# Patient Record
Sex: MALE | Race: WHITE | Employment: FULL TIME
[De-identification: names, ages, dates, MRNs, and addresses within clinical notes are randomized per-mention and may not be internally consistent; named-entity substitution may affect disease eponyms.]

---

## 2018-09-22 ENCOUNTER — HEALTH MAINTENANCE LETTER (OUTPATIENT)
Age: 63
End: 2018-09-22

## 2019-10-16 ENCOUNTER — HOSPITAL ENCOUNTER (OUTPATIENT)
Dept: WOUND CARE | Facility: CLINIC | Age: 64
Discharge: HOME | End: 2019-10-16
Attending: SURGERY | Admitting: SURGERY
Payer: COMMERCIAL

## 2019-10-16 VITALS
HEIGHT: 70 IN | RESPIRATION RATE: 19 BRPM | SYSTOLIC BLOOD PRESSURE: 125 MMHG | WEIGHT: 181.8 LBS | DIASTOLIC BLOOD PRESSURE: 81 MMHG | TEMPERATURE: 97.9 F | HEART RATE: 75 BPM | BODY MASS INDEX: 26.03 KG/M2

## 2019-10-16 DIAGNOSIS — L97.322 SKIN ULCER OF LEFT ANKLE WITH FAT LAYER EXPOSED (H): ICD-10-CM

## 2019-10-16 DIAGNOSIS — L97.922 ULCER OF LEFT LOWER EXTREMITY WITH FAT LAYER EXPOSED (H): Primary | ICD-10-CM

## 2019-10-16 PROBLEM — N52.9 ED (ERECTILE DYSFUNCTION) OF ORGANIC ORIGIN: Status: ACTIVE | Noted: 2018-02-14

## 2019-10-16 PROCEDURE — 99203 OFFICE O/P NEW LOW 30 MIN: CPT | Mod: 25 | Performed by: SURGERY

## 2019-10-16 PROCEDURE — 11104 PUNCH BX SKIN SINGLE LESION: CPT | Performed by: SURGERY

## 2019-10-16 PROCEDURE — 88305 TISSUE EXAM BY PATHOLOGIST: CPT | Mod: TC | Performed by: SURGERY

## 2019-10-16 PROCEDURE — G0463 HOSPITAL OUTPT CLINIC VISIT: HCPCS | Mod: 25

## 2019-10-16 PROCEDURE — 88312 SPECIAL STAINS GROUP 1: CPT | Mod: TC | Performed by: SURGERY

## 2019-10-16 PROCEDURE — A6197 ALGINATE DRSG >16 <=48 SQ IN: HCPCS

## 2019-10-16 PROCEDURE — 11104 PUNCH BX SKIN SINGLE LESION: CPT

## 2019-10-16 RX ORDER — DIOSMIN COMPLEX NO.1 630 MG
1 TABLET ORAL 2 TIMES DAILY
Qty: 180 TABLET | Refills: 3 | Status: SHIPPED | OUTPATIENT
Start: 2019-10-16

## 2019-10-16 RX ORDER — EMOLLIENT COMBINATION NO.32
1 EMULSION, EXTENDED RELEASE TOPICAL 2 TIMES DAILY
Qty: 225 G | Refills: 0 | Status: SHIPPED | OUTPATIENT
Start: 2019-10-16 | End: 2020-03-17

## 2019-10-16 RX ORDER — SILDENAFIL CITRATE 20 MG/1
TABLET ORAL
COMMUNITY
Start: 2019-01-23

## 2019-10-16 RX ORDER — MUPIROCIN 20 MG/G
OINTMENT TOPICAL
Refills: 0 | COMMUNITY
Start: 2019-10-08

## 2019-10-16 RX ORDER — CLOTRIMAZOLE AND BETAMETHASONE DIPROPIONATE 10; .64 MG/G; MG/G
CREAM TOPICAL
COMMUNITY
Start: 2019-07-31

## 2019-10-16 RX ORDER — CEPHALEXIN 500 MG/1
500 CAPSULE ORAL
COMMUNITY
Start: 2019-10-04 | End: 2019-10-24

## 2019-10-16 NOTE — DISCHARGE INSTRUCTIONS
"  [facility] WOUND HEALING [place]  [address] [facility] [address] [place] [address]  Appointment Phone [phone] Nurse Advisors [phone]    Armando Ballesteros      1955  1.Please add in 1 packet of Александр Supplement TWICE a day until your next appointment   2.Please add in Vitamin D3 Vitamin 5,000 international units per day.  3.Take Vasculera one tablet twice a day to help decrease inflammation of your chronic venous disease.  4.EpiCeram helps repair and heal the skin barrier through a unique mechanism of action not commonly found in other medications. It contains the skin s natural level of essential lipids: ceramides, cholesterol and free fatty acids, which are reduced in patients with eczema and atopic dermatitis.  EpiCeram is steroid-free, fragrance-free, noncomedogenic,paraben-free, and propylene glycol-free.   Available in a 90g tube and 225g airless pump  Please call [phone] to get this prescription  5.shop[employer] or call [phone] to place an order for 50 gram tube  Use PLUROHEALS (all caps) at checkout in the discount code section  6. Smoothtoe socks for compression after you are healed.  Wound Dressing Change:Left Lateral Ankle  Cleanse wound and surrounding skin with: prontosan wound cleanser  Apply Plurogel to wound base or on silvercel dressing  Cover wound Edemawear   Change dressing daily     GIANFRANCO Hodge M.D.. October 16, 2019    Call us at [phone] if you have any questions about your wounds, have redness or swelling around your wound, have a fever of 101 or greater or if you have any other problems or concerns. We answer the phone Monday through Friday 8 am to 4 pm, please leave a message as we check the voicemail frequently throughout the day.     Follow up with Provider - 2 weeks     \"EdemaWear Open Toe\"   Size: Medium        "

## 2019-10-16 NOTE — PROGRESS NOTES
Patient arrived for wound care visit. Certified Wound Care Nurse time spent evaluating patient record, completed a full evaluation and documented wound(s) & milton-wound skin; provided recommendation based on treatment plan. Applied dressing, reviewed discharge instructions, patient education, and discussed plan of care with appropriate medical team staff members and patient and/or family members.     Samples given for bryant [id number] Vasculera [phone] [facility] [id number]

## 2019-10-16 NOTE — PROGRESS NOTES
[facility] Wound Healing [place] Progress Note    Subject: Armando Ballesteros, patient self-referred for evaluation of left lateral inframalleolar recurrent ulceration, history of opioid vasculopathy, previously on Plavix, Plavix was stopped presently 2 years ago by physician at [facility] after completion of multiple venous procedures left lower extremity.  Patient is not overtly healthy 64-year-old male, diagnosed with livedoid vasculopathy in 2006, see Dr. Tiffanie Carrion's note, Lynda Geovanna, had been on Plavix.  Begin chewing tobacco several months ago, recurrence of left lateral infra malleolar ulceration, he has subsequently ceased tobacco utilization.  Describes pain, irritation, drainage, difficulty wearing a shoe.  Reinitiated wearing compression sock.  No history of claudication symptoms.  Denies history of known peripheral arterial disease, coronary artery disease, or associated autoimmune diseases.  Does not utilize vaping or other cigarettes.  Medical record reviewed, complete review of systems otherwise negative.    PMH:   Past Medical History:   Diagnosis Date     Diverticulosis of colon (without mention of hemorrhage) 4/08     Other and unspecified coagulation defects 6/22/2006     Patient Active Problem List   Diagnosis     COAGULAT DEFECT NEC/NOS     Insomnia     Family history of prostate cancer     Tinnitus     CARDIOVASCULAR SCREENING; LDL GOAL LESS THAN 160     Lateral epicondylitis     Backache     Family history of colon cancer     Social Hx:   Social History     Socioeconomic History     Marital status:      Spouse name: Not on file     Number of children: Not on file     Years of education: Not on file     Highest education level: Not on file   Occupational History     Not on file   Social Needs     Financial resource strain: Not on file     Food insecurity:     Worry: Not on file     Inability: Not on file     Transportation needs:     Medical: Not on file     Non-medical: Not on file    Tobacco Use     Smoking status: Never Smoker     Smokeless tobacco: Former User     Types: Chew     Tobacco comment: age [age], weekends only    Substance and Sexual Activity     Alcohol use: Yes     Comment: 5 drinks per weekend      Drug use: No     Sexual activity: Yes     Partners: Female     Comment: vasectomy 1993   Lifestyle     Physical activity:     Days per week: Not on file     Minutes per session: Not on file     Stress: Not on file   Relationships     Social connections:     Talks on phone: Not on file     Gets together: Not on file     Attends Mandaen service: Not on file     Active member of club or organization: Not on file     Attends meetings of clubs or organizations: Not on file     Relationship status: Not on file     Intimate partner violence:     Fear of current or ex partner: Not on file     Emotionally abused: Not on file     Physically abused: Not on file     Forced sexual activity: Not on file   Other Topics Concern      Service No     Blood Transfusions No     Caffeine Concern No     Comment: 1-2 cups coffee daily     Occupational Exposure Not Asked     Hobby Hazards Not Asked     Sleep Concern No     Comment: difficulty falling asleep, helped by Ambien     Stress Concern Yes     Weight Concern No     Special Diet No     Back Care No     Exercise Yes     Comment: daily      Bike Helmet Not Asked     Seat Belt Yes     Self-Exams Not Asked     Parent/sibling w/ CABG, MI or angioplasty before 65F 55M? Not Asked   Social History Narrative     Not on file       Surgical Hx:   Past Surgical History:   Procedure Laterality Date     C NONSPECIFIC PROCEDURE  1982    left shoulder surgery, AC separation     COLONOSCOPY  7/16/2013    Procedure: COMBINED COLONOSCOPY, SINGLE BIOPSY/POLYPECTOMY BY BIOPSY;  COLONOSCOPY;  Surgeon: Jason Sweet MD;  Location:  GI     HC COLONOSCOPY THRU STOMA, DIAGNOSTIC  2006     HC COLONOSCOPY THRU STOMA, DIAGNOSTIC  4/08    repeat in 5 yrs     LASIK   2003     ORTHOPEDIC SURGERY      (L) shoulder surgery - injury while skiing     VASECTOMY  1993       Allergies:    Allergies   Allergen Reactions     Ramelteon Other (See Comments)     insomnia  insomnia     Rozerem [Ramelteon]      insomnia     Trazodone      ineffective, tachycardia       Medications:   Current Outpatient Medications   Medication     cephALEXin (KEFLEX) 500 MG capsule     clotrimazole-betamethasone (LOTRISONE) 1-0.05 % external cream     Dermatological Products, Misc. (EPICERAM) EMUL     diclofenac (VOLTAREN) 1 % topical gel     Dietary Management Product (VASCULERA) TABS     order for DME     sildenafil (REVATIO) 20 MG tablet     ASPIRIN 81 MG OR TABS     clopidogrel (PLAVIX) 75 MG tablet     hydrocortisone 2.5 % cream     [facility]     mupirocin (BACTROBAN) 2 % external ointment     sildenafil (VIAGRA) 100 MG tablet     VITAMIN D 1000 UNIT OR TABS     zolpidem (AMBIEN) 5 MG tablet     No current facility-administered medications for this encounter.        Labs:   Recent Labs   Lab Test 06/25/15  0854   ALBUMIN 3.8   HGB 12.8*   WBC 4.4     Creatinine   Date Value Ref Range Status   06/25/2015 0.96 0.66 - 1.25 mg/dL Final     GFR Estimate   Date Value Ref Range Status   06/25/2015 80 >60 mL/min/1.7m2 Final     Comment:     Non  GFR Calc     GFR Estimate If Black   Date Value Ref Range Status   06/25/2015 >90   GFR Calc   >60 mL/min/1.7m2 Final     Lab Results   Component Value Date    WBC 4.4 06/25/2015     Lab Results   Component Value Date    RBC 4.02 06/25/2015     Lab Results   Component Value Date    HGB 12.8 06/25/2015     Lab Results   Component Value Date    HCT 39.3 06/25/2015     No components found for: MCT  Lab Results   Component Value Date    MCV 98 06/25/2015     Lab Results   Component Value Date    MCH 31.8 06/25/2015     Lab Results   Component Value Date    MCHC 32.6 06/25/2015     Lab Results   Component Value Date    RDW  "13.5 06/25/2015     Lab Results   Component Value Date     06/25/2015          Nutrition requirements were discussed with patient today.  Objective:  /81   Pulse 75   Temp 97.9  F (36.6  C) (Temporal)   Resp 19   Ht 1.778 m (5' 10\")   Wt 82.5 kg (181 lb 12.8 oz)   BMI 26.09 kg/m    Wound (used by OP [facility] only) 10/16/19 0854 Left lateral;lower leg (Active)   Length (cm) 2.3 10/16/2019  8:00 AM   Width (cm) 4.7 10/16/2019  8:00 AM   Depth (cm) 0.2 10/16/2019  8:00 AM   Wound (cm^2) 10.81 cm^2 10/16/2019  8:00 AM   Wound Volume (cm^3) 2.16 cm^3 10/16/2019  8:00 AM   Dressing Appearance moist drainage 10/16/2019  8:00 AM   Drainage Characteristics/Odor serosanguineous 10/16/2019  8:00 AM   Drainage Amount moderate 10/16/2019  8:00 AM        General:  Patient is alert and orientated, no acute distress.  Conversant.  Examination of the left lower extremity reveals intact left dorsalis pedis and posterior tibial pulses.  No left lower externally venous stasis changes or varicose veins noted.  Left lateral infra malleolar ulceration with atrophie mya, fibrosis, biofilm, very sensitive to touch.  Sensation on plantar surface and heel intact motor of the left foot grossly intact no other ulcerations or petechiae of the left lower extremity.  1% lidocaine was infiltrated into the periwound margin, 5 mm punch full-thickness biopsy obtained and sent to dermatopathology for evaluation.  Periwound erythema without cellulitis.  Laboratory changes.      Vascular: Pedal pulses intact, left leg.    Procedure:   Patient was determined to be capable of making their own medical decisions and informed consent was obtained, topical anesthetic of 1% lidocaine was applied, debridement was performed.  Punch biopsy obtained.  Was used to debride ulcer down to and including subcutaneous tissue. Bleeding controlled with light pressure. Patient tolerated procedure well.    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    Plan:  We will dress the wounds with Plurogel to wound, utilize EdemaWear from base of toes to high calf, change every day.  Vasculera 1 tablet daily, Александр 1 packet twice a day.  We will follow-up in approximately 2 weeks upon completion of dermatopathology.  This will determine whether or not an anti-10 a inhibitor is indicated though would obtain consultation from dermatology.  Patient understands importance of strict tobacco abstinence lifelong..  Patient will return to the clinic in 2 weeks time.     Riley Hodge MD on 10/16/2019 at 9:26 AM

## 2019-10-17 ENCOUNTER — TELEPHONE (OUTPATIENT)
Dept: WOUND CARE | Facility: CLINIC | Age: 64
End: 2019-10-17

## 2019-10-17 RX ORDER — TRAMADOL HYDROCHLORIDE 50 MG/1
50 TABLET ORAL EVERY 6 HOURS PRN
Qty: 20 TABLET | Refills: 0 | Status: SHIPPED | OUTPATIENT
Start: 2019-10-17 | End: 2019-10-30

## 2019-10-17 RX ORDER — GABAPENTIN 100 MG/1
100 CAPSULE ORAL 3 TIMES DAILY
Qty: 90 CAPSULE | Refills: 1 | Status: SHIPPED | OUTPATIENT
Start: 2019-10-17 | End: 2019-12-04

## 2019-10-17 NOTE — TELEPHONE ENCOUNTER
Reason for Call:  patient called regarding needing to  his prescriptions. He'll be up to pick them up.      Pt Provider: GIANFRANCO Hodge M.D.     Phone Number can be reached at: [phone]     Can we leave a detailed message on this number? YES       Alee Phan RN on 10/17/2019 at 12:14 PM      Routed to  Wound Healing Nurse Pool

## 2019-10-17 NOTE — ADDENDUM NOTE
Encounter addended by: Riley Hodge MD on: 10/17/2019 8:34 AM   Actions taken: Order list changed, Diagnosis association updated

## 2019-10-21 LAB — COPATH REPORT: NORMAL

## 2019-10-24 ENCOUNTER — HOSPITAL ENCOUNTER (OUTPATIENT)
Dept: WOUND CARE | Facility: CLINIC | Age: 64
Discharge: HOME | End: 2019-10-24
Attending: SURGERY | Admitting: SURGERY
Payer: COMMERCIAL

## 2019-10-24 VITALS — HEART RATE: 69 BPM | SYSTOLIC BLOOD PRESSURE: 150 MMHG | DIASTOLIC BLOOD PRESSURE: 74 MMHG | RESPIRATION RATE: 16 BRPM

## 2019-10-24 DIAGNOSIS — L97.322 SKIN ULCER OF LEFT ANKLE WITH FAT LAYER EXPOSED (H): ICD-10-CM

## 2019-10-24 PROCEDURE — A6209 FOAM DRSG <=16 SQ IN W/O BDR: HCPCS

## 2019-10-24 PROCEDURE — 99213 OFFICE O/P EST LOW 20 MIN: CPT | Performed by: SURGERY

## 2019-10-24 PROCEDURE — 97602 WOUND(S) CARE NON-SELECTIVE: CPT

## 2019-10-24 RX ORDER — LIDOCAINE HYDROCHLORIDE 40 MG/ML
5 SOLUTION TOPICAL PRN
Qty: 150 ML | Refills: 3 | Status: SHIPPED | OUTPATIENT
Start: 2019-10-24

## 2019-10-24 NOTE — DISCHARGE INSTRUCTIONS
[facility]  [address] [facility] [address], [facility] [address]  Appointment Phone [phone] Nurse Advisors [phone]    Armando Ballesteros      1955    1. packet of Александр Supplement TWICE a day until your next appointment  2.Vitamin D3 Vitamin 5,000 international units per day.  3.Take Vasculera one tablet twice a day to help decrease inflammation of your chronic  venous disease.  4.EpiCeram helps repair and heal the skin barrier through a unique mechanism of  action not commoly found in other medications.  5.Wound Dressing Change:Left Lateral Ankle  Cleanse wound and surrounding skin with: prontosan wound cleanser  Apply Plurogel to wound base or on hydrofera blue ready dressing  Cover wound Edemawear  Change dressing daily       GIANFRANCO Hodge M.D.. October 24, 2019    Call us at [phone] if you have any questions about your wounds, have redness or swelling around your wound, have a fever of 101 or greater or if you have any other problems or concerns. We answer the phone Monday through Friday 8 am to 4 pm, please leave a message as we check the voicemail frequently throughout the day.       Follow up with Provider - next week

## 2019-10-24 NOTE — PROGRESS NOTES
[facility] Wound Healing [place] Progress Note    Subject: Armando Ballesteros is seen for evaluation of left lateral infra malleolar ulceration, recurrent.  Pathology reviewed, consistent with stasis dermatitis but has known history of livedoid vasculopathy.  He is not currently on anticoagulants.  Pain has been intermittently very uncomfortable, he is utilizing Neurontin 100 mg 3 times a day.  Use of any narcotics.  No tobacco use.  Nondiabetic.  Previous biopsy results reviewed, was consistent with stasis dermatitis though I do believe this is livedoid vasculopathy, recurrent, secondary to recent tobacco use, currently ceased.    Patient Active Problem List   Diagnosis     COAGULAT DEFECT NEC/NOS     Insomnia     Family history of malignant neoplasm of prostate     Tinnitus     CARDIOVASCULAR SCREENING; LDL GOAL LESS THAN 160     Lateral epicondylitis     Backache     Family history of colon cancer     ED (erectile dysfunction) of organic origin     Family history of bicuspid aortic valve     Coagulation disorder (H)     Skin ulcer of left ankle with fat layer exposed (H)     Congenital anomaly of heart     Past Medical History:   Diagnosis Date     Diverticulosis of colon (without mention of hemorrhage) 4/08     Other and unspecified coagulation defects 6/22/2006     Exam:  BP (!) 150/74   Pulse 69   Resp 16   Wound (used by OP [facility] only) 10/16/19 0854 Left lateral;lower leg (Active)   Length (cm) 1.5 10/24/2019  2:00 PM   Width (cm) 4.6 10/24/2019  2:00 PM   Depth (cm) 0.7 10/24/2019  2:00 PM   Wound (cm^2) 6.9 cm^2 10/24/2019  2:00 PM   Wound Volume (cm^3) 4.83 cm^3 10/24/2019  2:00 PM   Wound healing % 36.17 10/24/2019  2:00 PM   Dressing Appearance moist drainage 10/24/2019  2:00 PM   Drainage Characteristics/Odor serosanguineous 10/24/2019  2:00 PM   Drainage Amount moderate 10/24/2019  2:00 PM   Thickness/Stage full thickness 10/24/2019  2:00 PM   Base red/granulating;slough 10/24/2019  2:00 PM   Periwound  intact 10/24/2019  2:00 PM   Periwound Temperature warm 10/24/2019  2:00 PM   Periwound Skin Turgor soft 10/24/2019  2:00 PM   Edges open 10/24/2019  2:00 PM   Care, Wound non-select wound debridement performed 10/24/2019  2:00 PM     General appearance is nondistressed, conversant, alert and oriented x3.  Palpable left dorsalis pedis pulse.  Wound at the infra malleolar left lateral region, improved overall appearance, decreased bioburden, granulating buds present, mild periwound maceration.  Biopsy site without purulence or cellulitis.    Impression: Livedoid vasculopathy left lower extremity, infra malleolar, lateral    Plan: We will dress the wounds with Plurogel to wound bed covered with Hydrofera Blue, extend Plurogel application to 2 to 3 cm outside of active wound margin given impact on subdermal microvasculature.  Neurontin 300 mg at night in addition to 100 mg twice a day during day as needed for pain control.  Prescription for 4% lidocaine provided so that he can put on topical lidocaine as needed if pain.  Continue EdemaWear.  Follow-up in approximately 1 week.  Patient will return to the clinic in 1 weeks time    Riley Hodge MD on 10/24/2019 at 4:33 PM

## 2019-10-30 ENCOUNTER — HOSPITAL ENCOUNTER (OUTPATIENT)
Dept: WOUND CARE | Facility: CLINIC | Age: 64
Discharge: HOME | End: 2019-10-30
Attending: SURGERY | Admitting: SURGERY
Payer: COMMERCIAL

## 2019-10-30 VITALS
TEMPERATURE: 97.1 F | RESPIRATION RATE: 16 BRPM | HEART RATE: 73 BPM | SYSTOLIC BLOOD PRESSURE: 120 MMHG | DIASTOLIC BLOOD PRESSURE: 68 MMHG

## 2019-10-30 DIAGNOSIS — L97.322 SKIN ULCER OF LEFT ANKLE WITH FAT LAYER EXPOSED (H): ICD-10-CM

## 2019-10-30 DIAGNOSIS — L97.922 ULCER OF LEFT LOWER EXTREMITY WITH FAT LAYER EXPOSED (H): ICD-10-CM

## 2019-10-30 PROCEDURE — 11042 DBRDMT SUBQ TIS 1ST 20SQCM/<: CPT

## 2019-10-30 PROCEDURE — 11042 DBRDMT SUBQ TIS 1ST 20SQCM/<: CPT | Performed by: SURGERY

## 2019-10-30 PROCEDURE — A6209 FOAM DRSG <=16 SQ IN W/O BDR: HCPCS

## 2019-10-30 RX ORDER — TRAMADOL HYDROCHLORIDE 50 MG/1
50 TABLET ORAL EVERY 6 HOURS PRN
Qty: 20 TABLET | Refills: 0 | Status: SHIPPED | OUTPATIENT
Start: 2019-10-30

## 2019-10-30 NOTE — ADDENDUM NOTE
Encounter addended by: Riley Hodge MD on: 10/30/2019 9:47 AM   Actions taken: Charge Capture section accepted

## 2019-10-30 NOTE — DISCHARGE INSTRUCTIONS
[facility]  [address] [facility] [address], [facility] [address]  Appointment Phone [phone] Nurse Advisors [phone]    Armando Ballesteros      1955    1. packet of Александр Supplement TWICE a day until your next appointment  2.Vitamin D3 Vitamin 5,000 international units per day.  3.Take Vasculera one tablet twice a day to help decrease inflammation of your chronic  venous disease.  4.EpiCeram helps repair and heal the skin barrier through a unique mechanism of  action not commoly found in other medications.  5.Wound Dressing Change:Left Lateral Ankle  Cleanse wound and surrounding skin with: prontosan wound cleanser  Apply Plurogel to wound base   Cover wound Edemawear  Than hydrofera blue ready dressing on out side and secure with roll gauze  Change dressing daily  Compression:   You have a light compression socks or Spandigrip size E is supposed to be removed at night and put back on first thing in the morning.   Please remove compression dressing if toes turn blue and/or tingle and can not be relieved by raising the leg for one hour.      GIANFRANCO Hodge M.D.. October 30, 2019    Call us at [phone] if you have any questions about your wounds, have redness or swelling around your wound, have a fever of 101 or greater or if you have any other problems or concerns. We answer the phone Monday through Friday 8 am to 4 pm, please leave a message as we check the voicemail frequently throughout the day.     Follow up with Provider - 2 weeks

## 2019-10-30 NOTE — PROGRESS NOTES
[facility] Wound Healing [place] Progress Note    Subject: Armando Ballesteros returns clinic for evaluation of left lateral infra malleolar livedoid vasculitis, off of anticoagulation, ceased chewing tobacco use.  Pain improved.  Placing Plurogel on wound and covering with EdemaWear.  Using EpiCeram cream to periwound margins.  On Vasculera.  Admittedly utilizing tramadol at night for sleeping though overall pain is markedly improved.  Denies fevers chills or sweats.    Patient Active Problem List   Diagnosis     COAGULAT DEFECT NEC/NOS     Insomnia     Family history of malignant neoplasm of prostate     Tinnitus     CARDIOVASCULAR SCREENING; LDL GOAL LESS THAN 160     Lateral epicondylitis     Backache     Family history of colon cancer     ED (erectile dysfunction) of organic origin     Family history of bicuspid aortic valve     Coagulation disorder (H)     Skin ulcer of left ankle with fat layer exposed (H)     Congenital anomaly of heart     Past Medical History:   Diagnosis Date     Diverticulosis of colon (without mention of hemorrhage) 4/08     Other and unspecified coagulation defects 6/22/2006     Exam:  /68   Pulse 73   Temp 97.1  F (36.2  C) (Temporal)   Resp 16   Wound (used by OP [facility] only) 10/16/19 0854 Left lateral;lower leg (Active)   Length (cm) 2 10/30/2019  8:00 AM   Width (cm) 4.5 10/30/2019  8:00 AM   Depth (cm) 0.7 10/30/2019  8:00 AM   Wound (cm^2) 9 cm^2 10/30/2019  8:00 AM   Wound Volume (cm^3) 6.3 cm^3 10/30/2019  8:00 AM   Wound healing % 16.74 10/30/2019  8:00 AM   Dressing Appearance moist drainage 10/30/2019  8:00 AM   Drainage Characteristics/Odor serosanguineous 10/30/2019  8:00 AM   Drainage Amount moderate 10/30/2019  8:00 AM   Thickness/Stage full thickness 10/30/2019  8:00 AM   Base red/granulating 10/30/2019  8:00 AM   Periwound intact 10/30/2019  8:00 AM   Periwound Temperature warm 10/30/2019  8:00 AM   Periwound Skin Turgor soft 10/30/2019  8:00 AM   Edges open  10/30/2019  8:00 AM   Care, Wound debrided 10/30/2019  8:00 AM     General appearance is nondistressed, conversant, alert and oriented x3.  Biofilm present left lateral leg wound, debrided, see wound documentation for measurements and photo identification.  No periwound cellulitis.  Markedly less sensitive to any manipulation.    Procedure:   Patient was determined to be capable of making their own medical decisions and informed consent was obtained. Topical anesthetic of 4% lidocaine was applied, debridement was performed using a #15 blade down to and including subcutaneous tissue and biofilm bleeding controlled with light pressure. Patient tolerated procedure well.    Impression: Livedoid vasculitis left lower extremity    Plan: We will dress the wounds with Plurogel, blue lined EdemaWear directly on skin, covered with Hydrofera Blue.  Continue Александр supplementation, vitamin D supplementation, Vasculera and EpiCeram application to periwound margin.  He does wear light compression sock over the EdemaWear..  Patient will return to the clinic in 4 weeks time    Riley Hodge MD on 10/30/2019 at 9:43 AM

## 2019-11-26 ENCOUNTER — TELEPHONE (OUTPATIENT)
Dept: WOUND CARE | Facility: CLINIC | Age: 64
End: 2019-11-26

## 2019-11-26 NOTE — TELEPHONE ENCOUNTER
[facility]    Who is the name of the provider?:  Ayesha      What is the location you see this provider at?: [place]    Reason for call:  Wants appt for 12/4 called to  or texted to his phone    Can we leave a detailed message on this number?  YES

## 2019-12-04 ENCOUNTER — HOSPITAL ENCOUNTER (OUTPATIENT)
Dept: WOUND CARE | Facility: CLINIC | Age: 64
Discharge: HOME | End: 2019-12-04
Attending: SURGERY | Admitting: SURGERY
Payer: COMMERCIAL

## 2019-12-04 VITALS
TEMPERATURE: 97.1 F | SYSTOLIC BLOOD PRESSURE: 132 MMHG | HEART RATE: 70 BPM | DIASTOLIC BLOOD PRESSURE: 85 MMHG | RESPIRATION RATE: 16 BRPM

## 2019-12-04 DIAGNOSIS — L97.922 ULCER OF LEFT LOWER EXTREMITY WITH FAT LAYER EXPOSED (H): ICD-10-CM

## 2019-12-04 DIAGNOSIS — L97.322 SKIN ULCER OF LEFT ANKLE WITH FAT LAYER EXPOSED (H): ICD-10-CM

## 2019-12-04 PROCEDURE — 97602 WOUND(S) CARE NON-SELECTIVE: CPT

## 2019-12-04 PROCEDURE — 99212 OFFICE O/P EST SF 10 MIN: CPT | Performed by: SURGERY

## 2019-12-04 RX ORDER — GABAPENTIN 100 MG/1
100 CAPSULE ORAL 3 TIMES DAILY
Qty: 90 CAPSULE | Refills: 1 | Status: SHIPPED | OUTPATIENT
Start: 2019-12-04

## 2019-12-04 NOTE — PROGRESS NOTES
[facility] Wound Healing [place] Progress Note    Subject: Armando Ballesteros returns for evaluation left lateral infra malleolar recurrent ulceration, responding to EdemaWear and Plurogel, he has ceased all nicotine exposure, wound had recurred after a nicotine exposure this summer.  Pain is resolved.    Patient Active Problem List   Diagnosis     COAGULAT DEFECT NEC/NOS     Insomnia     Family history of malignant neoplasm of prostate     Tinnitus     CARDIOVASCULAR SCREENING; LDL GOAL LESS THAN 160     Lateral epicondylitis     Backache     Family history of colon cancer     ED (erectile dysfunction) of organic origin     Family history of bicuspid aortic valve     Coagulation disorder (H)     Skin ulcer of left ankle with fat layer exposed (H)     Congenital anomaly of heart     Past Medical History:   Diagnosis Date     Diverticulosis of colon (without mention of hemorrhage) 4/08     Other and unspecified coagulation defects 6/22/2006     Exam:  /85   Pulse 70   Temp 97.1  F (36.2  C) (Temporal)   Resp 16   Wound (used by OP [facility] only) 10/16/19 0854 Left lateral;lower leg (Active)   Length (cm) 1 12/4/2019 10:00 AM   Width (cm) 0.5 12/4/2019 10:00 AM   Depth (cm) 0.2 12/4/2019 10:00 AM   Wound (cm^2) 0.5 cm^2 12/4/2019 10:00 AM   Wound Volume (cm^3) 0.1 cm^3 12/4/2019 10:00 AM   Wound healing % 95.37 12/4/2019 10:00 AM   Dressing Appearance moist drainage 12/4/2019 10:00 AM   Drainage Characteristics/Odor serosanguineous 12/4/2019 10:00 AM   Drainage Amount moderate 12/4/2019 10:00 AM   Thickness/Stage full thickness 12/4/2019 10:00 AM   Base red/granulating 12/4/2019 10:00 AM   Periwound intact 12/4/2019 10:00 AM   Periwound Temperature warm 12/4/2019 10:00 AM   Periwound Skin Turgor soft 12/4/2019 10:00 AM   Edges open 12/4/2019 10:00 AM   Care, Wound non-select wound debridement performed 12/4/2019 10:00 AM     64-year-old male, palpable left dorsalis pedis pulse, see photo documentation.  Mild  inflammatory changes, no evidence of cellulitis.  You are completed epithelialization.    Impression: Near complete resolution and closure left lateral infra malleolar ulceration, recurrent    Plan: We will dress the wounds with Plurogel, EdemaWear, continue for additional month.  Continue Vasculera lifelong.  Discontinue vitamin D when wound completely resolved..  Patient will return to the clinic in 4 weeks time as needed if wound not completely resolved.  I have also advised that he transition to smooth toe socks for lifelong mild compression and use of EpiCeram cream over area of wound to decrease recidivism.    Riley Hodge MD on 12/4/2019 at 12:00 PM

## 2019-12-04 NOTE — DISCHARGE INSTRUCTIONS
[facility]  [address] [address]  Appointment Phone [phone]     Armando Ballesteros      1955    1. packet of Александр Supplement TWICE a day until your next appointment  2.Vitamin D3 Vitamin 5,000 international units per day.  3.Take Vasculera one tablet twice a day to help decrease inflammation of your chronic venous disease.  4.EpiCeram helps repair and heal the skin barrier through a unique mechanism of  action not commoly found in other medications.  5.Wound Dressing Change:Left Lateral Ankle  Cleanse wound and surrounding skin with: prontosan wound cleanser  Apply Plurogel to wound base  Cover wound Edemawear  Change dressing daily  Compression:  You have a light compression socks or Spandigrip size E is supposed to be removed at  night and put back on first thing in the morning.  Please remove compression dressing if toes turn blue and/or tingle and can not be  relieved by raising the leg for one hour.       GIANFRANCO Hodge M.D.. December 4, 2019    Call us at [phone] if you have any questions about your wounds, have redness or swelling around your wound, have a fever of 101 or greater or if you have any other problems or concerns. We answer the phone Monday through Friday 8 am to 4 pm, please leave a message as we check the voicemail frequently throughout the day.     Follow up with Provider - if needed

## 2019-12-15 ENCOUNTER — HEALTH MAINTENANCE LETTER (OUTPATIENT)
Age: 64
End: 2019-12-15

## 2020-03-17 DIAGNOSIS — L97.922 ULCER OF LEFT LOWER EXTREMITY WITH FAT LAYER EXPOSED (H): ICD-10-CM

## 2020-03-17 RX ORDER — EMOLLIENT COMBINATION NO.32
1 EMULSION, EXTENDED RELEASE TOPICAL 2 TIMES DAILY
Qty: 225 G | Refills: 11 | Status: SHIPPED | OUTPATIENT
Start: 2020-03-17

## 2020-05-05 ENCOUNTER — TELEPHONE (OUTPATIENT)
Dept: WOUND CARE | Facility: CLINIC | Age: 65
End: 2020-05-05

## 2020-05-05 NOTE — TELEPHONE ENCOUNTER
Patient contacted [facility] for refill of Epiceram pump- 225 Gram, 2 bottles. They need a call back for clarification. [phone]  Fax [phone]

## 2021-01-15 ENCOUNTER — HEALTH MAINTENANCE LETTER (OUTPATIENT)
Age: 66
End: 2021-01-15

## 2021-09-04 ENCOUNTER — HEALTH MAINTENANCE LETTER (OUTPATIENT)
Age: 66
End: 2021-09-04

## 2022-10-22 ENCOUNTER — HEALTH MAINTENANCE LETTER (OUTPATIENT)
Age: 67
End: 2022-10-22

## 2023-11-05 ENCOUNTER — HEALTH MAINTENANCE LETTER (OUTPATIENT)
Age: 68
End: 2023-11-05